# Patient Record
Sex: MALE | URBAN - METROPOLITAN AREA
[De-identification: names, ages, dates, MRNs, and addresses within clinical notes are randomized per-mention and may not be internally consistent; named-entity substitution may affect disease eponyms.]

---

## 2023-10-02 ENCOUNTER — OFFICE VISIT (OUTPATIENT)
Dept: URBAN - METROPOLITAN AREA CLINIC 27 | Facility: CLINIC | Age: 48
End: 2023-10-02
Payer: COMMERCIAL

## 2023-10-02 ENCOUNTER — DASHBOARD ENCOUNTERS (OUTPATIENT)
Age: 48
End: 2023-10-02

## 2023-10-02 ENCOUNTER — LAB OUTSIDE AN ENCOUNTER (OUTPATIENT)
Dept: URBAN - METROPOLITAN AREA CLINIC 27 | Facility: CLINIC | Age: 48
End: 2023-10-02

## 2023-10-02 VITALS
HEIGHT: 68 IN | SYSTOLIC BLOOD PRESSURE: 95 MMHG | DIASTOLIC BLOOD PRESSURE: 77 MMHG | HEART RATE: 108 BPM | BODY MASS INDEX: 23.95 KG/M2 | WEIGHT: 158 LBS

## 2023-10-02 DIAGNOSIS — R10.819 ABDOMINAL TENDERNESS, UNSPECIFIED SITE: ICD-10-CM

## 2023-10-02 DIAGNOSIS — R11.2 NAUSEA WITH VOMITING, UNSPECIFIED: ICD-10-CM

## 2023-10-02 DIAGNOSIS — R19.7 DIARRHEA: ICD-10-CM

## 2023-10-02 PROCEDURE — 99203 OFFICE O/P NEW LOW 30 MIN: CPT | Performed by: INTERNAL MEDICINE

## 2023-10-02 RX ORDER — ONDANSETRON HYDROCHLORIDE 4 MG/1
1 TABLET TABLET, FILM COATED ORAL
Qty: 30 | Refills: 0 | OUTPATIENT
Start: 2023-10-03

## 2023-10-02 NOTE — HPI-TODAY'S VISIT:
This is a 48-year-old male seen as a new patient in consultation for acute abdominal pain nausea and vomiting.  He was  a week ago Saturday.  They had sushi at the wedding but he felt fine Saturday.  Then Sunday night he woke up with severe chest and abdominal discomfort.  He went to the emergency room got IV fluids.  Had nausea vomiting and diarrhea he felt better Monday Tuesday he went to the baseball game but then Thursday he had a return of nausea vomiting diarrhea and severe abdominal discomfort.  He went to urgent care and received an IV and was given PPI therapy.  Friday night he had pain once again and went to the emergency room Saturday.  He had a CT scan which she states showed a kidney cyst but was otherwise unrevealing.  That is not available today.  Today he is starting to feel little bit better.  The diarrhea seems to be a little bit better today.  He does note that he has been on Ozempic recently and has lost weight.  Of note his white count was 13,000 in the emergency room.  He is feeling a little bit better today he has not been on any NSAIDs laboratory studies were otherwise unrevealing.

## 2023-10-03 ENCOUNTER — LAB OUTSIDE AN ENCOUNTER (OUTPATIENT)
Dept: URBAN - METROPOLITAN AREA CLINIC 27 | Facility: CLINIC | Age: 48
End: 2023-10-03

## 2023-10-03 LAB
ABSOLUTE BASOPHILS: 16
ABSOLUTE EOSINOPHILS: 541
ABSOLUTE LYMPHOCYTES: 1886
ABSOLUTE MONOCYTES: 705
ABSOLUTE NEUTROPHILS: 5051
BASOPHILS: 0.2
EOSINOPHILS: 6.6
HEMATOCRIT: 44.6
HEMOGLOBIN: 15.5
LYMPHOCYTES: 23
MCH: 30.7
MCHC: 34.8
MCV: 88.3
MONOCYTES: 8.6
MPV: 10
NEUTROPHILS: 61.6
PLATELET COUNT: 287
RDW: 12.4
RED BLOOD CELL COUNT: 5.05
WHITE BLOOD CELL COUNT: 8.2

## 2023-10-05 ENCOUNTER — TELEPHONE ENCOUNTER (OUTPATIENT)
Dept: URBAN - METROPOLITAN AREA CLINIC 27 | Facility: CLINIC | Age: 48
End: 2023-10-05

## 2023-10-10 LAB
CAMPYLOBACTER SPP. AG,EIA: (no result)
GIARDIA AG, EIA, STOOL: (no result)
OVA AND PARASITES, CONC AND PERM SMEAR: (no result)
SALMONELLA AND SHIGELLA, CULTURE: (no result)
SHIGA TOXINS, EIA W/RFL TO E.COLI O157 CULTURE: (no result)